# Patient Record
Sex: MALE | Race: BLACK OR AFRICAN AMERICAN | NOT HISPANIC OR LATINO | Employment: FULL TIME | URBAN - METROPOLITAN AREA
[De-identification: names, ages, dates, MRNs, and addresses within clinical notes are randomized per-mention and may not be internally consistent; named-entity substitution may affect disease eponyms.]

---

## 2017-12-28 ENCOUNTER — CLINICAL SUPPORT (OUTPATIENT)
Dept: OCCUPATIONAL MEDICINE | Facility: CLINIC | Age: 27
End: 2017-12-28

## 2017-12-28 DIAGNOSIS — Z11.1 ENCOUNTER FOR PPD TEST: Primary | ICD-10-CM

## 2017-12-28 PROCEDURE — 86580 TB INTRADERMAL TEST: CPT | Mod: S$GLB,,,

## 2017-12-28 NOTE — PROGRESS NOTES
2:30 PPD Skin Test to left forearm. Pt tolerated well. Informed pt to return in 72 hrs.. 12- before 2:30.

## 2021-02-01 ENCOUNTER — HISTORICAL (OUTPATIENT)
Dept: ADMINISTRATIVE | Facility: HOSPITAL | Age: 31
End: 2021-02-01

## 2021-02-01 LAB
APPEARANCE, UA: CLEAR
BACTERIA #/AREA URNS AUTO: ABNORMAL /HPF
BILIRUB UR QL STRIP: NEGATIVE
COLOR UR: NORMAL
GLUCOSE (UA): NEGATIVE
HGB UR QL STRIP: NEGATIVE
HYALINE CASTS #/AREA URNS LPF: ABNORMAL /LPF
KETONES UR QL STRIP: NEGATIVE
LEUKOCYTE ESTERASE UR QL STRIP: NEGATIVE
NITRITE UR QL STRIP: NEGATIVE
PH UR STRIP: 6.5 [PH] (ref 4.5–8)
PROT UR QL STRIP: NEGATIVE
RBC #/AREA URNS AUTO: ABNORMAL /HPF
SP GR UR STRIP: 1.02 (ref 1–1.03)
SQUAMOUS #/AREA URNS LPF: ABNORMAL /LPF
UROBILINOGEN UR STRIP-ACNC: NORMAL
WBC #/AREA URNS AUTO: ABNORMAL /HPF

## 2021-06-28 ENCOUNTER — HISTORICAL (OUTPATIENT)
Dept: ADMINISTRATIVE | Facility: HOSPITAL | Age: 31
End: 2021-06-28

## 2022-04-10 ENCOUNTER — HISTORICAL (OUTPATIENT)
Dept: ADMINISTRATIVE | Facility: HOSPITAL | Age: 32
End: 2022-04-10
Payer: MEDICAID

## 2022-04-28 VITALS
OXYGEN SATURATION: 98 % | SYSTOLIC BLOOD PRESSURE: 125 MMHG | WEIGHT: 211.88 LBS | HEIGHT: 66 IN | BODY MASS INDEX: 34.05 KG/M2 | DIASTOLIC BLOOD PRESSURE: 77 MMHG

## 2022-05-03 NOTE — HISTORICAL [facility] CERNER
This is a historical note converted from [place]. Formatting and pictures may have been removed.  Please reference Ap[place]onia for original formatting and attached multimedia. Chief Complaint  EST pcp  History of Present Illness  30 Years?Black or ?Male?presents to [facility] to establish care?with PCP. ?PMH (per review of?available EMR?and confirmed with patient).  ?   Previous PCP:  PmHx: 2013 Mandible fracture, Gastritis, Anxiety  SHx:? Fracture Repair  FHx:??Mother: healthy  ????????? Father:? healthy  Social Hx:  ???? 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 Nutrition:?? Regular diet: eats about 1 meal a day or twice.  ???? Caffeine intake? 1 soda/ day  ???? Alcohol intake?? occasionally  ???? Tobacco Intake??? 4-5 cig/day; Smoking since age 19  ???? Illicit drug use?? Denies  ???? Sexual history: 1 partner, female preference; no condom use  ???? Lives with??? girlfriend  ???? Daily Exercise?? walk, push ups  ?   Complaints today: Here to establish care.?  Periodically still experiences drainage from under chin, he feels like plate may be infected, mouth was wired up for 2 months.? Had drainage tube in chin due to swelling face.? He states he was due for follow-up to remove drainage tube and hospital closed down in [facility].? He never had any follow-up after that.? States drainage tube fell out on its own.?  [facility] billing and health to get copies of medical records.?  Off of Lexapro since about August.? Hx anxiety. States was on low-dose Lexapro before.?  ?   Vaccines:  Tetanus/Tdap: due today  Flu:?UTD  Review of Systems  General: negative except as stated in hpi  Skin: negative except as stated in hpi  HEENT: negative except as stated in hpi  Respiratory: negative except as stated in hpi  CV: negative except as stated in hpi  GI: negative except as stated in hpi  : negative except as stated in hpi  MS: negative except as stated in hpi  Neuro: negative except as stated in hpi  Hematologic: negative  except as stated in hpi  Endocrine: negative except as stated in hpi  Psych: negative except as stated in hpi  Physical Exam  Vitals & Measurements  T:?36.9? ?C (Oral)? HR:?79(Peripheral)? RR:?20? BP:?125/77? SpO2:?98%?  HT:?167.00?cm? WT:?96.100?kg? BMI:?34.46?  General: Alert and oriented, No acute distress.  Head: Normocephalic, Atraumatic.  Eye: Pupils are equal, round and reactive to light, Extraocular movements intact, Sclera non-icteric.  Ears/Nose/Throat:?Normal, Mucosa moist, Clear.  Neck/Thyroid: Supple, Non-tender, No carotid bruit, No lymphadenopathy, Full range of motion.  Respiratory: Clear to auscultation bilaterally, Respirations non-labored, Breath sounds equal, Symmetrical chest wall expansion, No wheezes, rales or rhonchi.  Cardiovascular: Regular rate and rhythm, No murmurs, rubs or gallops.  Gastrointestinal: Soft, Non-tender, Non-distended, Normal bowel sounds.  Musculoskeletal: Normal range of motion.  Integumentary: Warm, Dry, Intact, No suspicious lesions or rashes. Under chin there is an old puncture wound , no hair growing out of area with mild serosanguinous drainage present that has dried up.  Extremities: No clubbing, cyanosis or edema.  Neurologic: No focal deficits, Cranial Nerves II-XII are grossly intact, Motor strength normal upper and lower extremities, Sensory exam intact.  Psychiatric: Normal interaction, Coherent speech, Appropriate affect.  Assessment/Plan  1.?Encounter for wellness examination?Z00.00  Labs today: CBC, CMP, TSH, Free T4, HgbA1c,? UA  RTC 3 months  Ordered:  1160F- Medication reconciliation completed during visit, Encounter for wellness examination  Obesity  Tobacco user, [facility], 02/01/21 15:17:00 CST  CBC w/ Auto Diff, Routine collect, *Est. 02/01/21 3:00:00 CST, Blood, Order for future visit, *Est. Stop date 02/01/21 3:00:00 CST, Lab Collect, Encounter for wellness examination  Obesity, 02/01/21 15:17:00 CST  Clinic Follow up, *Est. 05/01/21 3:00:00  CDT, Order for future visit, Encounter for wellness examination  Obesity  Tobacco user, [facility]  Comprehensive Metabolic Panel, Routine collect, *Est. 02/01/21 3:00:00 CST, Blood, Order for future visit, *Est. Stop date 02/01/21 3:00:00 CST, Lab Collect, Encounter for wellness examination  Obesity, 02/01/21 15:17:00 CST  Free T4, Routine collect, *Est. 02/01/21 3:00:00 CST, Blood, Order for future visit, *Est. Stop date 02/01/21 3:00:00 CST, Lab Collect, Encounter for wellness examination  Obesity, 02/01/21 15:18:00 CST  Hemoglobin A1C [facility], Routine collect, *Est. 02/01/21 3:00:00 CST, Blood, Order for future visit, *Est. Stop date 02/01/21 3:00:00 CST, Lab Collect, Encounter for wellness examination  Obesity, 02/01/21 15:18:00 CST  Office/Outpatient Visit Level 4 Established [id number] PC, Encounter for wellness examination  Obesity  Tobacco user, [facility], 02/01/21 15:17:00 CST  Thyroid Stimulating Hormone, Routine collect, *Est. 02/01/21 3:00:00 CST, Blood, Order for future visit, *Est. Stop date 02/01/21 3:00:00 CST, Lab Collect, Encounter for wellness examination  Obesity, 02/01/21 15:18:00 CST  Urinalysis with Microscopic if Indicated, Routine collect, Urine, 02/01/21 15:50:00 CST, Stop date 02/01/21 15:50:00 CST, Nurse collect, Encounter for wellness examination  Obesity  ?  2.?Obesity?E66.9  BMI??34.46.? Goal BMI less than 30.  Aerobic exercise 150min/week  Avoid soda, simple sugars, saturated fats and processed foods, sweets, excessive rice, pasta, potatoes, bread  Limit fast food  Eat plenty of fresh fruits and vegetables  Ordered:  1160F- Medication reconciliation completed during visit, Encounter for wellness examination  Obesity  Tobacco user, [facility], 02/01/21 15:17:00 CST  CBC w/ Auto Diff, Routine collect, *Est. 02/01/21 3:00:00 CST, Blood, Order for future visit, *Est. Stop date 02/01/21 3:00:00 CST, Lab Collect, Encounter for wellness examination  Obesity,  02/01/21 15:17:00 CST  Clinic Follow up, *Est. 05/01/21 3:00:00 CDT, Order for future visit, Encounter for wellness examination  Obesity  Tobacco user, [facility]  Comprehensive Metabolic Panel, Routine collect, *Est. 02/01/21 3:00:00 CST, Blood, Order for future visit, *Est. Stop date 02/01/21 3:00:00 CST, Lab Collect, Encounter for wellness examination  Obesity, 02/01/21 15:17:00 CST  Free T4, Routine collect, *Est. 02/01/21 3:00:00 CST, Blood, Order for future visit, *Est. Stop date 02/01/21 3:00:00 CST, Lab Collect, Encounter for wellness examination  Obesity, 02/01/21 15:18:00 CST  Hemoglobin A1C [facility], Routine collect, *Est. 02/01/21 3:00:00 CST, Blood, Order for future visit, *Est. Stop date 02/01/21 3:00:00 CST, Lab Collect, Encounter for wellness examination  Obesity, 02/01/21 15:18:00 CST  Office/Outpatient Visit Level 4 Established [id number] PC, Encounter for wellness examination  Obesity  Tobacco user, [facility], 02/01/21 15:17:00 CST  Thyroid Stimulating Hormone, Routine collect, *Est. 02/01/21 3:00:00 CST, Blood, Order for future visit, *Est. Stop date 02/01/21 3:00:00 CST, Lab Collect, Encounter for wellness examination  Obesity, 02/01/21 15:18:00 CST  Urinalysis with Microscopic if Indicated, Routine collect, Urine, 02/01/21 15:50:00 CST, Stop date 02/01/21 15:50:00 CST, Nurse collect, Encounter for wellness examination  Obesity  ?  3.?Tobacco user?Z72.0  Smoking cessation discussed.?  Pt not ready to quit.  Referred to smoking cessation counseling.  Discussed benefits of quitting including improved health, decreased cardiac/vascular/pulmonary/stroke risks as well as saving money.  Ordered:  1160F- Medication reconciliation completed during visit, Encounter for wellness examination  Obesity  Tobacco user, [facility], 02/01/21 15:17:00 CST  Clinic Follow up, *Est. 05/01/21 3:00:00 CDT, Order for future visit, Encounter for wellness examination  Obesity  Tobacco user, [facility]  Internal Referral to Smoking Cessation, ASKED and ADVISED to quit. Ready to Quit, Telephone Counseling, 02/01/21 15:54:00 CST, Tobacco user  Office/Outpatient Visit Level 4 Established [id number] PC, Encounter for wellness examination  Obesity  Tobacco user, [facility], 02/01/21 15:17:00 CST  ?  4.?History of mandibular surgery?Z98.890  XR mandible to evaluate hardware  Ordered:  XR Mandible 4 Views, Routine, *Est. 02/01/21 3:00:00 CST, Other (please specify), hardware from previous surgery years ago, intermittent drainage from site, None, Ambulatory, hardware present, looking for any displacement or infection, Rad Type, Order for future visit, Hi...  ?  5.?Immunization due?Z23  TDAP  ?  6.?Anxiety?F41.9  Lexapro 10mg po daily sent to pharmacy  Take meds as prescribed.  Practice deep breathing or abdominal breathing exercises when anxiety occurs.  Exercise daily. Get sunlight daily.  Avoid caffeine, alcohol?and stimulants.  Do not use illicit drugs.  Practice positive phrases and repeat throughout the day, yoga,?lavender scents or Chamomile tea will help anxiety.  Set healthy boundaries, avoid people and conversations that increase stress.  Reports any symptoms of suicidal or homicidal ideations immediately, go to nearest emergency room.  Ordered:  escitalopram, 10 mg = 1 tab(s), Oral, Daily, # 30 tab(s), 3 Refill(s), Pharmacy: [facility] #[id number], 167, cm, Height/Length Dosing, 02/01/21 15:13:00 CST, 96.1, kg, Weight Dosing, 02/01/21 15:13:00 CST  ?  Referrals  Internal Referral to Smoking Cessation, ASKED and ADVISED to quit. Ready to Quit, Telephone Counseling, 02/01/21 15:54:00 CST, Tobacco user  Clinic Follow up, *Est. 05/01/21 3:00:00 CDT, Order for future visit, Encounter for wellness examination  Obesity  Tobacco user, [facility]   Problem List/Past Medical History  Ongoing  Anxiety  Obesity  Historical  No qualifying data  Medications  Lexapro 10 mg oral  tablet, 10 mg= 1 tab(s), Oral, Daily, 3 refills  Allergies  No Known Allergies  Social History  Abuse/Neglect  No, No, Yes, 02/01/2021  Alcohol  Never, 02/01/2021  Employment/School  Employed, 02/01/2021  Exercise  Exercise frequency: 1-2 times/week. Exercise type: Walking., 02/01/2021  Financial/Legal Situation  None, Salary patient, 02/01/2021  Home/Environment  Lives with Significant other. Living situation: Home/Independent., 02/01/2021    Never in , 02/01/2021  Nutrition/Health  Regular, Good, 02/01/2021  Sexual  Sexually active: Yes. Number of current partners 1. Sexual orientation: Straight or heterosexual. Gender Identity Identifies as male., 02/01/2021  Spiritual/Cultural  Sabianism, 02/01/2021  Substance Use  Never, 02/01/2021  Tobacco  4 or less cigarettes(less than 1/4 pack)/day in last 30 days, Cigarettes, No, 02/01/2021  Immunizations  Vaccine Date Status Comments   tetanus/diphtheria/pertussis, acel(Tdap) 02/01/2021 Given    tetanus/diphtheria/pertussis, acel(Tdap) - Not Given Expectation Not Necessary  Pt had a TDAP in the last 5 years   hepatitis B pediatric vaccine 12/11/2000 Recorded    hepatitis B pediatric vaccine 10/12/2000 Recorded    Health Maintenance  Health Maintenance  ???Pending?(in the next year)  ??? ??OverDue  ??? ? ? ?Influenza Vaccine due??10/01/20??and every 1??day(s)  ??? ??Due?  ??? ? ? ?Diabetes Screening due??02/01/21??Unknown Frequency  ??? ??Refused?  ??? ? ? ?Smoking Cessation due??01/01/21??Variable frequency  ??? ??Due In Future?  ??? ? ? ?Obesity Screening not due until??01/01/22??and every 1??year(s)  ??? ? ? ?Alcohol Misuse Screening not due until??01/02/22??and every 1??year(s)  ???Satisfied?(in the past 1 year)  ??? ??Satisfied?  ??? ? ? ?ADL Screening on??02/01/21.??Satisfied by Radha Rocha LPN.  ??? ? ? ?Alcohol Misuse Screening on??02/01/21.??Satisfied by Concetta Alan  ??? ? ? ?Blood Pressure Screening on??02/01/21.??Satisfied by Aj  Radha JACOB  ??? ? ? ?Body Mass Index Check on??02/01/21.??Satisfied by Radha Rocha LPN  ??? ? ? ?Depression Screening on??02/01/21.??Satisfied by Radha Rocha LPN  ??? ? ? ?Obesity Screening on??02/01/21.??Satisfied by Radha Rocha LPN  ??? ? ? ?Tetanus Vaccine on??02/01/21.??Satisfied by Radha Rocha LPN  ??? ??Refused?  ??? ? ? ?Smoking Cessation on??02/01/21.??Recorded by Radha Rocha LPN??Reason: Patient Refuses  ?

## 2022-07-19 RX ORDER — ESCITALOPRAM OXALATE 10 MG/1
TABLET ORAL
Qty: 30 TABLET | OUTPATIENT
Start: 2022-07-19